# Patient Record
Sex: FEMALE | URBAN - METROPOLITAN AREA
[De-identification: names, ages, dates, MRNs, and addresses within clinical notes are randomized per-mention and may not be internally consistent; named-entity substitution may affect disease eponyms.]

---

## 2023-02-06 ENCOUNTER — CLAIMS CREATED FROM THE CLAIM WINDOW (OUTPATIENT)
Dept: URBAN - METROPOLITAN AREA MEDICAL CENTER 19 | Facility: MEDICAL CENTER | Age: 68
End: 2023-02-06
Payer: MEDICARE

## 2023-02-06 DIAGNOSIS — E87.6 HYPOKALEMIA: ICD-10-CM

## 2023-02-06 DIAGNOSIS — K57.30 ACQUIRED DIVERTICULOSIS OF COLON: ICD-10-CM

## 2023-02-06 DIAGNOSIS — K80.50 BILE DUCT CALCULUS: ICD-10-CM

## 2023-02-06 DIAGNOSIS — I48.91 AFIB: ICD-10-CM

## 2023-02-06 DIAGNOSIS — K85.10 ACUTE BILIARY PANCREATITIS: ICD-10-CM

## 2023-02-06 DIAGNOSIS — K83.8 ACQUIRED DILATION OF BILE DUCT: ICD-10-CM

## 2023-02-06 DIAGNOSIS — R74.01 ELEVATED TRANSAMINASE LEVEL: ICD-10-CM

## 2023-02-06 PROCEDURE — 43264 ERCP REMOVE DUCT CALCULI: CPT | Performed by: INTERNAL MEDICINE

## 2023-02-06 PROCEDURE — 99222 1ST HOSP IP/OBS MODERATE 55: CPT | Performed by: INTERNAL MEDICINE

## 2023-02-06 PROCEDURE — 74328 X-RAY BILE DUCT ENDOSCOPY: CPT | Performed by: INTERNAL MEDICINE

## 2023-02-06 PROCEDURE — 43277 ERCP EA DUCT/AMPULLA DILATE: CPT | Performed by: INTERNAL MEDICINE

## 2023-02-07 ENCOUNTER — CLAIMS CREATED FROM THE CLAIM WINDOW (OUTPATIENT)
Dept: URBAN - METROPOLITAN AREA MEDICAL CENTER 19 | Facility: MEDICAL CENTER | Age: 68
End: 2023-02-07
Payer: MEDICARE

## 2023-02-07 ENCOUNTER — TELEPHONE ENCOUNTER (OUTPATIENT)
Dept: URBAN - METROPOLITAN AREA CLINIC 113 | Facility: CLINIC | Age: 68
End: 2023-02-07

## 2023-02-07 ENCOUNTER — LAB OUTSIDE AN ENCOUNTER (OUTPATIENT)
Dept: URBAN - METROPOLITAN AREA CLINIC 113 | Facility: CLINIC | Age: 68
End: 2023-02-07

## 2023-02-07 DIAGNOSIS — K80.50 BILE DUCT CALCULUS: ICD-10-CM

## 2023-02-07 PROCEDURE — 99232 SBSQ HOSP IP/OBS MODERATE 35: CPT | Performed by: INTERNAL MEDICINE

## 2023-02-16 PROBLEM — 398050005 DIVERTICULAR DISEASE OF COLON: Status: ACTIVE | Noted: 2023-02-16

## 2023-02-17 LAB
A/G RATIO: 1.5
ALBUMIN: 4.4
ALKALINE PHOSPHATASE: 102
ALT (SGPT): 18
AST (SGOT): 27
BILIRUBIN, TOTAL: 0.9
BUN/CREATININE RATIO: (no result)
BUN: 12
CALCIUM: 10.1
CARBON DIOXIDE, TOTAL: 26
CHLORIDE: 106
CREATININE: 0.71
EGFR: 92
GLOBULIN, TOTAL: 2.9
GLUCOSE: 96
HEMATOCRIT: 42.6
HEMOGLOBIN: 14.1
LIPASE: 29
MCH: 32
MCHC: 33.1
MCV: 96.6
MPV: 9.9
PLATELET COUNT: 362
POTASSIUM: 4
PROTEIN, TOTAL: 7.3
RDW: 12.2
RED BLOOD CELL COUNT: 4.41
SODIUM: 143
WHITE BLOOD CELL COUNT: 7

## 2023-03-29 ENCOUNTER — OFFICE VISIT (OUTPATIENT)
Dept: URBAN - METROPOLITAN AREA MEDICAL CENTER 19 | Facility: MEDICAL CENTER | Age: 68
End: 2023-03-29
Payer: MEDICARE

## 2023-03-29 DIAGNOSIS — Z12.11 COLON CANCER SCREENING: ICD-10-CM

## 2023-03-29 PROCEDURE — G0121 COLON CA SCRN NOT HI RSK IND: HCPCS | Performed by: INTERNAL MEDICINE

## 2023-04-17 ENCOUNTER — DASHBOARD ENCOUNTERS (OUTPATIENT)
Age: 68
End: 2023-04-17

## 2023-04-17 ENCOUNTER — OFFICE VISIT (OUTPATIENT)
Dept: URBAN - METROPOLITAN AREA CLINIC 113 | Facility: CLINIC | Age: 68
End: 2023-04-17
Payer: MEDICARE

## 2023-04-17 VITALS
RESPIRATION RATE: 16 BRPM | WEIGHT: 274 LBS | DIASTOLIC BLOOD PRESSURE: 91 MMHG | HEIGHT: 60 IN | TEMPERATURE: 97.5 F | SYSTOLIC BLOOD PRESSURE: 151 MMHG | HEART RATE: 58 BPM | BODY MASS INDEX: 53.79 KG/M2

## 2023-04-17 DIAGNOSIS — K57.30 ACQUIRED DIVERTICULOSIS OF COLON: ICD-10-CM

## 2023-04-17 PROCEDURE — 99213 OFFICE O/P EST LOW 20 MIN: CPT | Performed by: INTERNAL MEDICINE

## 2023-04-17 RX ORDER — LOSARTAN POTASSIUM 25 MG/1
1 TABLET TABLET ORAL ONCE A DAY
Status: ACTIVE | COMMUNITY

## 2023-04-17 RX ORDER — APIXABAN 5 MG/1
1 TABLET TABLET, FILM COATED ORAL TWICE A DAY
Status: ACTIVE | COMMUNITY

## 2023-04-17 NOTE — HPI-TODAY'S VISIT:
Colonoscopy March 29, 2023.  Moderate sigmoid diverticulosis.  Otherwise normal.  Follow-up recommended in 10 years. . Labs February 7, 2023.  Hemoglobin 11.6.  Platelets 227,000.  ALT 69, AST 55, alk phosphatase 164. . ERCP February 6, 2023.  Markedly dilated 18 mm common bile duct notable for choledocholithiasis.  Status post sphincterotomy and balloon dilation of the ampulla with subsequent extraction of 2 large distal stones. . MRI with MRCP February, 2023.  Partially obstructing distal common bile duct stones.  Mild intrahepatic biliary ductal dilation. . Abdominal ultrasound February 2023.  Common bile duct not dilated.  Liver was normal.

## 2023-04-17 NOTE — HPI-TODAY'S VISIT:
68 yo F with recent choledocholithiasis presents for office f/u. . At this point she is not having any issues with dysphagia heartburn or regurgitation.  No abdominal pain.  No diarrhea constipation.  No rectal bleeding or melena.  She is cheerful and in no distress today.  We discussed the findings of her recent MRI and ERCP.  We discussed her recent colonoscopy. .